# Patient Record
Sex: MALE | Race: WHITE | NOT HISPANIC OR LATINO | Employment: FULL TIME | URBAN - METROPOLITAN AREA
[De-identification: names, ages, dates, MRNs, and addresses within clinical notes are randomized per-mention and may not be internally consistent; named-entity substitution may affect disease eponyms.]

---

## 2017-01-20 ENCOUNTER — OFFICE VISIT (OUTPATIENT)
Dept: MEDICAL GROUP | Facility: PHYSICIAN GROUP | Age: 72
End: 2017-01-20
Payer: MEDICARE

## 2017-01-20 VITALS
TEMPERATURE: 97.9 F | HEART RATE: 66 BPM | OXYGEN SATURATION: 97 % | SYSTOLIC BLOOD PRESSURE: 136 MMHG | WEIGHT: 196 LBS | BODY MASS INDEX: 26.55 KG/M2 | DIASTOLIC BLOOD PRESSURE: 74 MMHG | HEIGHT: 72 IN

## 2017-01-20 DIAGNOSIS — I10 ESSENTIAL HYPERTENSION: ICD-10-CM

## 2017-01-20 DIAGNOSIS — E78.2 MIXED HYPERLIPIDEMIA: ICD-10-CM

## 2017-01-20 DIAGNOSIS — K21.9 GASTROESOPHAGEAL REFLUX DISEASE WITHOUT ESOPHAGITIS: ICD-10-CM

## 2017-01-20 PROCEDURE — 99214 OFFICE O/P EST MOD 30 MIN: CPT | Performed by: FAMILY MEDICINE

## 2017-01-20 RX ORDER — MULTIVITAMIN WITH IRON
TABLET ORAL 2 TIMES DAILY
COMMUNITY

## 2017-01-20 RX ORDER — TERBINAFINE HYDROCHLORIDE 250 MG/1
250 TABLET ORAL DAILY
COMMUNITY

## 2017-01-20 RX ORDER — B-COMPLEX WITH VITAMIN C
TABLET ORAL
COMMUNITY

## 2017-01-20 RX ORDER — AMOXICILLIN 500 MG
CAPSULE ORAL
COMMUNITY

## 2017-01-20 NOTE — Clinical Note
January 20, 2017     Denilson White  [address] [facility] [address]  [facility] [address]      Dear Denilson:    Thank you for enrolling in Superfly. Please follow the instructions below to securely access your online medical record. Superfly allows you to send messages to your healthcare team, view certain test results, renew your prescriptions, schedule appointments, and more.     How Do I Sign Up?  1. In your Internet browser, go to  [email or web address]  2. Click on the Sign Up Now link in the Sign In box. You will see the New Member Sign Up page.  3. Enter your Superfly Access Code exactly as it appears below (case sensitive). You will not need to use this code after you’ve completed the sign-up process. If you do not sign up before the expiration date, you must request a new code.  Superfly Access Code: [email or web address]  Expires: 2/19/2017  5:25 PM    4. Enter your Email address and Date of Birth (mm/dd/yyyy) as indicated and click Submit. You will be taken to the next sign-up page.  5. Create a Superfly ID (case sensitive) . This will be your Superfly login ID and cannot be changed, so think of one that is secure and easy to remember.  6. Create a Superfly password  (case sensitive).   · Your password must be a length of at least 6 characters/digits.  · It must include at least 1 numeric.  · You can change your password at any time.  7. Enter your Password Reset Question and Answer. This can be used at a later time if you forget your password.   8. Enter your e-mail address. You will receive e-mail notification when new information is available in Superfly.  9. Click Sign Up. You can now view your medical record.     Additional Information  Please contact Superfly Customer Support at [phone] for any questions . Remember, Superfly is NOT to be used for urgent needs. For medical emergencies, dial 911.          Introducing Superfly    [facility]’s Secure, Online Health Connection      [facility] now  offers convenient, online access to your healthcare team and personal health information.  [employer] makes managing your healthcare easier than ever, and allows you to:  • Email your healthcare provider securely and privately  • Access your test results  • Request prescription refills 24 hours a day  • View your personal medical records from home  • Schedule or change your appointments  • View your Insurance and Billing Information  • Pay bills online ? Coming soon!        Sign below to get started:  I hereby request access to [employer] application.  I agree to abide by the [employer] Terms and Conditions, which will be provided to me upon activating my account.       __________________________________        _________________________  Name (Please Print)          Date of Birth     __________________________________       _________________________  Signature          Primary Care Provider      _______________  Date                          *For Internal Use Only: Please scan this form into the patient’s chart. Click on  - Select Patient - Attach to Encounter:  - Document Type: Consent   - Document Description: MyChart Consent

## 2017-01-20 NOTE — MR AVS SNAPSHOT
Denilson White   2017 5:00 PM   Office Visit   MRN: [id number]    Department:  [facility]   Dept Phone:  [phone]    Description:  Male : 1945   Provider:  Odalys Lilly D.O.           Reason for Visit     Establish Care           Allergies as of 2017     No Known Allergies      You were diagnosed with     Mixed hyperlipidemia   [272.2.ICD-9-CM]       Essential hypertension   [6227657]       Gastroesophageal reflux disease without esophagitis   [410306]         Vital Signs     Blood Pressure Pulse Temperature Height Weight Body Mass Index    136/74 mmHg 66 36.6 °C (97.9 °F) 1.829 m (6') 88.905 kg (196 lb) 26.58 kg/m2    Oxygen Saturation Smoking Status                97% Never Smoker           Basic Information     Date Of Birth Sex Race Ethnicity Preferred Language    1945 Male White Non- English      Problem List              ICD-10-CM Priority Class Noted - Resolved    Hyperlipidemia E78.5   2015 - Present    GERD (gastroesophageal reflux disease) K21.9   2015 - Present    Hypertension I10   2015 - Present    CAD (coronary artery disease), autologous vein bypass graft I25.810   2015 - Present    History of colonic polyps Z86.010   2015 - Present    Tinnitus of both ears H93.13   2015 - Present      Health Maintenance        Date Due Completion Dates    IMM DTaP/Tdap/Td Vaccine (1 - Tdap) 10/28/1995 10/27/1995    IMM ZOSTER VACCINE 3/14/2007 ---    IMM PNEUMOCOCCAL 65+ (ADULT) LOW/MEDIUM RISK SERIES (2 of 2 - PCV13) 2016    IMM INFLUENZA (1) 2016 10/27/2014    COLONOSCOPY 3/27/2024 3/27/2014 (Done)    Override on 3/27/2014: Done            Current Immunizations     Influenza Vaccine Quad Inj (Pf) 10/27/2014    Pneumococcal polysaccharide vaccine (PPSV-23) 2015    TD Vaccine 10/27/1995    Tuberculin Skin Test 2014 12:25 PM, 2014 10:00 AM      Below and/or attached are the medications your provider  expects you to take. Review all of your home medications and newly ordered medications with your provider and/or pharmacist. Follow medication instructions as directed by your provider and/or pharmacist. Please keep your medication list with you and share with your provider. Update the information when medications are discontinued, doses are changed, or new medications (including over-the-counter products) are added; and carry medication information at all times in the event of emergency situations     Allergies:  No Known Allergies          Medications  Valid as of: January 20, 2017 -  5:25 PM    Generic Name Brand Name Tablet Size Instructions for use    Aspirin (Chew Tab) ASA 81 MG Take 1 Tab by mouth every day.        B Complex Vitamins (Tab) Vitamin B Complex  Take  by mouth.        Cholecalciferol (Cap) vitamin D3 5000 UNITS Take 1 Cap by mouth every day.        Lisinopril (Tab) PRINIVIL 20 MG Take 20 mg by mouth every day.        Magnesium (Tab) Magnesium 250 MG Take  by mouth 2 times a day.        Omega-3 Fatty Acids (Cap) Fish Oil 1200 MG Take  by mouth.        Terbinafine HCl (Tab) LAMISIL 250 MG Take 250 mg by mouth every day.        .                 Medicines prescribed today were sent to:     [facility] #[id number] - [address] AT [address] [address]    Phone: [phone] Fax: [phone]    Open 24 Hours?: No      Medication refill instructions:       If your prescription bottle indicates you have medication refills left, it is not necessary to call your provider’s office. Please contact your pharmacy and they will refill your medication.    If your prescription bottle indicates you do not have any refills left, you may request refills at any time through one of the following ways: The online [employer] system (except Urgent Care), by calling your provider’s office, or by asking your pharmacy to contact your provider’s office with a refill request. Medication  refills are processed only during regular business hours and may not be available until the next business day. Your provider may request additional information or to have a follow-up visit with you prior to refilling your medication.   *Please Note: Medication refills are assigned a new Rx number when refilled electronically. Your pharmacy may indicate that no refills were authorized even though a new prescription for the same medication is available at the pharmacy. Please request the medicine by name with the pharmacy before contacting your provider for a refill.           [employer] Access Code: [email or web address]  Expires: 2/19/2017  5:25 PM    [employer]  A secure, online tool to manage your health information     [employer]’s [employer]® is a secure, online tool that connects you to your personalized health information from the privacy of your home -- day or night - making it very easy for you to manage your healthcare. Once the activation process is completed, you can even access your medical information using the [employer] eileen, which is available for free in the Apple Eileen store or Google Play store.     [employer] provides the following levels of access (as shown below):   My Chart Features   [facility] Doctor [facility]  Special[facility]  Urgent  Care Non-[facility]  Primary Care  Doctor   Email your healthcare team securely and privately 24/7 X X X    Manage appointments: schedule your next appointment; view details of past/upcoming appointments X      Request prescription refills. X      View recent personal medical records, including lab and immunizations X X X X   View health record, including health history, allergies, medications X X X X   Read reports about your outpatient visits, procedures, consult and ER notes X X X X   See your discharge summary, which is a recap of your hospital and/or ER visit that includes your diagnosis, lab results, and care plan. X X       How to register for [employer]:  1. Go to   [email or web address].  2. Click on the Sign Up Now box, which takes you to the New Member Sign Up page. You will need to provide the following information:  a. Enter your [employer] Access Code exactly as it appears at the top of this page. (You will not need to use this code after you’ve completed the sign-up process. If you do not sign up before the expiration date, you must request a new code.)   b. Enter your date of birth.   c. Enter your home email address.   d. Click Submit, and follow the next screen’s instructions.  3. Create a [employer] ID. This will be your [employer] login ID and cannot be changed, so think of one that is secure and easy to remember.  4. Create a [email or web address] password. You can change your password at any time.  5. Enter your Password Reset Question and Answer. This can be used at a later time if you forget your password.   6. Enter your e-mail address. This allows you to receive e-mail notifications when new information is available in [employer].  7. Click Sign Up. You can now view your health information.    For assistance activating your [employer] account, call [phone]

## 2017-01-21 NOTE — ASSESSMENT & PLAN NOTE
Ongoing issue. Patient reports 100% compliance with medication; he is currently on lisinopril 20 mg daily. Patient denies any issues with dry cough, headache, dizziness, chest pain. Chart review shows that current blood pressure and heart rate are in the normal range for his age.

## 2017-01-21 NOTE — ASSESSMENT & PLAN NOTE
Ongoing issue. Patient reports that since he is started eating healthier and exercising his losses significant amount of weight. He was also having side effects of muscle cramps and weakness on his statin. He is taking himself off the statin.    [facility] and get medications refilled. He reports that he will have a follow-up with them within 2 months. At that time he will have a lipid panel done to determine if his cholesterol is now within a normal range. Of concern is that he did have coronary artery bypass in the past and so it is imperative that he have a normal cholesterol level. Patient is concerned though since he has had side effects with statins in the past

## 2017-01-21 NOTE — PROGRESS NOTES
Subjective:   Denilson White is a 71 y.o. male here today for GERD; elevated LDLc; HTN    GERD (gastroesophageal reflux disease)  Ongoing issue. Patient reports that with recent lifestyle changes including healthier eating along with daily exercise he has lost a significant amount of weight and reports that he no longer needs his omeprazole. He states that he is not having any symptoms of heartburn or reflux at this time.    Hyperlipidemia  Ongoing issue. Patient reports that since he is started eating healthier and exercising his losses significant amount of weight. He was also having side effects of muscle cramps and weakness on his statin. He is taking himself off the statin.    He states that [facility] to get his [facility] work done and get medications refilled. He reports that he will have a follow-up with them within 2 months. At that time he will have a lipid panel done to determine if his cholesterol is now within a normal range. Of concern is that he did have coronary artery bypass in the past and so it is imperative that he have a normal cholesterol level. Patient is concerned though since he has had side effects with statins in the past    Hypertension  Ongoing issue. Patient reports 100% compliance with medication; he is currently on lisinopril 20 mg daily. Patient denies any issues with dry cough, headache, dizziness, chest pain. Chart review shows that current blood pressure and heart rate are in the normal range for his age.     Pt is here today to [facility]; he is transferring care from [facility]. [facility].     Current medicines (including changes today)  Current Outpatient Prescriptions   Medication Sig Dispense Refill   • Omega-3 Fatty Acids (FISH OIL) 1200 MG Cap Take  by mouth.     • terbinafine (LAMISIL) 250 MG Tab Take 250 mg by mouth every day.     • B Complex Vitamins (VITAMIN B COMPLEX) Tab Take  by mouth.     •  Magnesium 250 MG Tab Take  by mouth 2 times a day.     • Cholecalciferol (VITAMIN D3) 5000 UNITS Cap Take 1 Cap by mouth every day.     • aspirin (ASA) 81 MG CHEW chewable tablet Take 1 Tab by mouth every day. 100 Tab 11   • lisinopril (PRINIVIL) 20 MG TABS Take 20 mg by mouth every day.       No current facility-administered medications for this visit.     He  has a past medical history of Hyperlipidemia (1/13/2015); GERD (gastroesophageal reflux disease) (1/13/2015); Hypertension (1/13/2015); CAD (coronary artery disease), autologous vein bypass graft (1/13/2015); Personal history of colonic polyps (1/13/2015); and Tinnitus of both ears (1/13/2015).    ROS   No chest pain, no shortness of breath, no abdominal pain       Objective:     Blood pressure 136/74, pulse 66, temperature 36.6 °C (97.9 °F), height 1.829 m (6'), weight 88.905 kg (196 lb), SpO2 97 %. Body mass index is 26.58 kg/(m^2).   Physical Exam:  Alert, oriented in no acute distress.  Eye contact is good, speech goal directed, affect calm  HEENT: conjunctiva non-injected, sclera non-icteric.  Pinna normal. TM pearly gray.   Oral mucous membranes pink and moist with no lesions.  Neck No adenopathy or masses in the neck or supraclavicular regions.  Lungs: clear to auscultation bilaterally with good excursion.  CV: regular rate and rhythm. Moderate systolic ejection murmurs noted  Abdomen: soft, nontender, No CVAT  Ext: no edema, color normal, vascularity normal, temperature normal  Neuro: CN 2-12 grossly intact      Assessment and Plan:   The following treatment plan was discussed     1. Mixed hyperlipidemia      Stable. Encouraged patient to continue healthy lifestyle; get labs rechecked. Monitor   2. Essential hypertension      Stable. Continue current medications; monitor   3. Gastroesophageal reflux disease without esophagitis      Stable. Monitor       Followup: Return in about 1 year (around 1/20/2018) for annual physical exam, Short.

## 2017-01-21 NOTE — ASSESSMENT & PLAN NOTE
Ongoing issue. Patient reports that with recent lifestyle changes including healthier eating along with daily exercise he has lost a significant amount of weight and reports that he no longer needs his omeprazole. He states that he is not having any symptoms of heartburn or reflux at this time.

## 2018-05-30 ENCOUNTER — PATIENT OUTREACH (OUTPATIENT)
Dept: HEALTH INFORMATION MANAGEMENT | Facility: OTHER | Age: 73
End: 2018-05-30

## 2018-05-30 NOTE — PROGRESS NOTES
1. Attempt #: 1    2. HealthConnect Verified: yes    3. Verify PCP: yes    4. Care Team Updated:       •   DME Company (gait device, O2, CPAP, etc.): N\A       •   Other Specialists (eye doctor, derm, GYN, cardiology, endo, etc): N\A    5.  Reviewed/Updated the following with patient:       •   Communication Preference Obtained? NO       •   Preferred Pharmacy? NO       •   Preferred Lab? NO       •   Family History (document living status of immediate family members and if + hx of cancer, diabetes, hypertension, hyperlipidemia, heart attack, stroke) NO    6. Fly6 Activation: already active    7. Fly6 Eileen: no    8. Annual Wellness Visit Scheduling  Scheduling Status:Not Scheduled. Patient states they are not interested - Patient is traveling, unaware of when they will be back in town. Will call back to schedule.      9. Care Gap Scheduling (Attempt to Schedule EACH Overdue Care Gap!)     Health Maintenance Due   Topic Date Due   • Annual Wellness Visit  1945   • IMM DTaP/Tdap/Td Vaccine (1 - Tdap) 10/28/1995   • IMM PNEUMOCOCCAL 65+ (ADULT) LOW/MEDIUM RISK SERIES (2 of 2 - PCV13) 01/13/2016       10. Patient was advised: “This is a free wellness visit. The provider will screen for medical conditions to help you stay healthy. If you have other concerns to address you may be asked to discuss these at a separate visit or there may be an additional fee.”     11. Patient was NOT informed to arrive 15 min prior to their scheduled appointment and bring in their medication bottles.

## 2021-01-15 DIAGNOSIS — Z23 NEED FOR VACCINATION: ICD-10-CM
